# Patient Record
Sex: MALE | Race: WHITE | NOT HISPANIC OR LATINO | Employment: FULL TIME | URBAN - NONMETROPOLITAN AREA
[De-identification: names, ages, dates, MRNs, and addresses within clinical notes are randomized per-mention and may not be internally consistent; named-entity substitution may affect disease eponyms.]

---

## 2017-04-17 ENCOUNTER — NON-PROVIDER VISIT (OUTPATIENT)
Dept: URGENT CARE | Facility: PHYSICIAN GROUP | Age: 27
End: 2017-04-17

## 2017-04-17 DIAGNOSIS — Z02.1 PRE-EMPLOYMENT DRUG SCREENING: ICD-10-CM

## 2017-04-17 LAB
AMP AMPHETAMINE: NORMAL
COC COCAINE: NORMAL
INT CON NEG: NEGATIVE
INT CON POS: POSITIVE
MET METHAMPHETAMINES: NORMAL
OPI OPIATES: NORMAL
PCP PHENCYCLIDINE: NORMAL
POC DRUG COMMENT 753798-OCCUPATIONAL HEALTH: NORMAL
THC: NORMAL

## 2017-04-17 PROCEDURE — 80305 DRUG TEST PRSMV DIR OPT OBS: CPT | Performed by: FAMILY MEDICINE

## 2018-07-02 ENCOUNTER — APPOINTMENT (OUTPATIENT)
Dept: RADIOLOGY | Facility: MEDICAL CENTER | Age: 28
End: 2018-07-02
Attending: EMERGENCY MEDICINE
Payer: MEDICAID

## 2018-07-02 ENCOUNTER — HOSPITAL ENCOUNTER (EMERGENCY)
Facility: MEDICAL CENTER | Age: 28
End: 2018-07-02
Attending: EMERGENCY MEDICINE
Payer: MEDICAID

## 2018-07-02 VITALS
TEMPERATURE: 98.4 F | DIASTOLIC BLOOD PRESSURE: 78 MMHG | BODY MASS INDEX: 27.2 KG/M2 | RESPIRATION RATE: 18 BRPM | SYSTOLIC BLOOD PRESSURE: 112 MMHG | HEIGHT: 69 IN | WEIGHT: 183.64 LBS | HEART RATE: 76 BPM | OXYGEN SATURATION: 98 %

## 2018-07-02 DIAGNOSIS — N20.1 URETERAL STONE: ICD-10-CM

## 2018-07-02 LAB
ALBUMIN SERPL BCP-MCNC: 4.5 G/DL (ref 3.2–4.9)
ALBUMIN/GLOB SERPL: 2 G/DL
ALP SERPL-CCNC: 56 U/L (ref 30–99)
ALT SERPL-CCNC: 16 U/L (ref 2–50)
ANION GAP SERPL CALC-SCNC: 7 MMOL/L (ref 0–11.9)
APPEARANCE UR: ABNORMAL
AST SERPL-CCNC: 19 U/L (ref 12–45)
BACTERIA #/AREA URNS HPF: NEGATIVE /HPF
BASOPHILS # BLD AUTO: 0.2 % (ref 0–1.8)
BASOPHILS # BLD: 0.01 K/UL (ref 0–0.12)
BILIRUB SERPL-MCNC: 1.2 MG/DL (ref 0.1–1.5)
BILIRUB UR QL STRIP.AUTO: NEGATIVE
BUN SERPL-MCNC: 14 MG/DL (ref 8–22)
CALCIUM SERPL-MCNC: 9.3 MG/DL (ref 8.5–10.5)
CAOX CRY #/AREA URNS HPF: ABNORMAL /HPF
CHLORIDE SERPL-SCNC: 106 MMOL/L (ref 96–112)
CO2 SERPL-SCNC: 24 MMOL/L (ref 20–33)
COLOR UR: ABNORMAL
CREAT SERPL-MCNC: 1.05 MG/DL (ref 0.5–1.4)
EOSINOPHIL # BLD AUTO: 0.08 K/UL (ref 0–0.51)
EOSINOPHIL NFR BLD: 1.2 % (ref 0–6.9)
EPI CELLS #/AREA URNS HPF: ABNORMAL /HPF
ERYTHROCYTE [DISTWIDTH] IN BLOOD BY AUTOMATED COUNT: 41.1 FL (ref 35.9–50)
GLOBULIN SER CALC-MCNC: 2.3 G/DL (ref 1.9–3.5)
GLUCOSE SERPL-MCNC: 110 MG/DL (ref 65–99)
GLUCOSE UR STRIP.AUTO-MCNC: NEGATIVE MG/DL
HCT VFR BLD AUTO: 49.6 % (ref 42–52)
HGB BLD-MCNC: 16.6 G/DL (ref 14–18)
IMM GRANULOCYTES # BLD AUTO: 0.01 K/UL (ref 0–0.11)
IMM GRANULOCYTES NFR BLD AUTO: 0.2 % (ref 0–0.9)
KETONES UR STRIP.AUTO-MCNC: 15 MG/DL
LEUKOCYTE ESTERASE UR QL STRIP.AUTO: ABNORMAL
LYMPHOCYTES # BLD AUTO: 1.26 K/UL (ref 1–4.8)
LYMPHOCYTES NFR BLD: 19.5 % (ref 22–41)
MCH RBC QN AUTO: 29.6 PG (ref 27–33)
MCHC RBC AUTO-ENTMCNC: 33.5 G/DL (ref 33.7–35.3)
MCV RBC AUTO: 88.4 FL (ref 81.4–97.8)
MICRO URNS: ABNORMAL
MONOCYTES # BLD AUTO: 0.4 K/UL (ref 0–0.85)
MONOCYTES NFR BLD AUTO: 6.2 % (ref 0–13.4)
NEUTROPHILS # BLD AUTO: 4.71 K/UL (ref 1.82–7.42)
NEUTROPHILS NFR BLD: 72.7 % (ref 44–72)
NITRITE UR QL STRIP.AUTO: POSITIVE
NRBC # BLD AUTO: 0 K/UL
NRBC BLD-RTO: 0 /100 WBC
PH UR STRIP.AUTO: 7 [PH]
PLATELET # BLD AUTO: 183 K/UL (ref 164–446)
PMV BLD AUTO: 10.2 FL (ref 9–12.9)
POTASSIUM SERPL-SCNC: 3.7 MMOL/L (ref 3.6–5.5)
PROT SERPL-MCNC: 6.8 G/DL (ref 6–8.2)
PROT UR QL STRIP: 100 MG/DL
RBC # BLD AUTO: 5.61 M/UL (ref 4.7–6.1)
RBC # URNS HPF: >150 /HPF
RBC UR QL AUTO: ABNORMAL
SODIUM SERPL-SCNC: 137 MMOL/L (ref 135–145)
SP GR UR STRIP.AUTO: 1.02
UROBILINOGEN UR STRIP.AUTO-MCNC: 2 MG/DL
WBC # BLD AUTO: 6.5 K/UL (ref 4.8–10.8)
WBC #/AREA URNS HPF: ABNORMAL /HPF

## 2018-07-02 PROCEDURE — 99285 EMERGENCY DEPT VISIT HI MDM: CPT

## 2018-07-02 PROCEDURE — 81001 URINALYSIS AUTO W/SCOPE: CPT

## 2018-07-02 PROCEDURE — 96361 HYDRATE IV INFUSION ADD-ON: CPT

## 2018-07-02 PROCEDURE — 700105 HCHG RX REV CODE 258: Performed by: EMERGENCY MEDICINE

## 2018-07-02 PROCEDURE — 700111 HCHG RX REV CODE 636 W/ 250 OVERRIDE (IP): Performed by: EMERGENCY MEDICINE

## 2018-07-02 PROCEDURE — 96375 TX/PRO/DX INJ NEW DRUG ADDON: CPT

## 2018-07-02 PROCEDURE — 85025 COMPLETE CBC W/AUTO DIFF WBC: CPT

## 2018-07-02 PROCEDURE — 80053 COMPREHEN METABOLIC PANEL: CPT

## 2018-07-02 PROCEDURE — 74176 CT ABD & PELVIS W/O CONTRAST: CPT

## 2018-07-02 PROCEDURE — 96374 THER/PROPH/DIAG INJ IV PUSH: CPT

## 2018-07-02 RX ORDER — TAMSULOSIN HYDROCHLORIDE 0.4 MG/1
0.4 CAPSULE ORAL DAILY
Qty: 7 CAP | Refills: 0 | Status: SHIPPED | OUTPATIENT
Start: 2018-07-02 | End: 2018-07-09

## 2018-07-02 RX ORDER — ONDANSETRON 4 MG/1
4 TABLET, ORALLY DISINTEGRATING ORAL EVERY 6 HOURS PRN
Qty: 20 TAB | Refills: 0 | Status: SHIPPED | OUTPATIENT
Start: 2018-07-02 | End: 2022-05-26

## 2018-07-02 RX ORDER — SODIUM CHLORIDE, SODIUM LACTATE, POTASSIUM CHLORIDE, CALCIUM CHLORIDE 600; 310; 30; 20 MG/100ML; MG/100ML; MG/100ML; MG/100ML
1000 INJECTION, SOLUTION INTRAVENOUS ONCE
Status: COMPLETED | OUTPATIENT
Start: 2018-07-02 | End: 2018-07-02

## 2018-07-02 RX ORDER — IBUPROFEN 600 MG/1
600 TABLET ORAL EVERY 6 HOURS PRN
Qty: 30 TAB | Refills: 0 | Status: SHIPPED | OUTPATIENT
Start: 2018-07-02 | End: 2022-05-26

## 2018-07-02 RX ORDER — ONDANSETRON 2 MG/ML
4 INJECTION INTRAMUSCULAR; INTRAVENOUS ONCE
Status: COMPLETED | OUTPATIENT
Start: 2018-07-02 | End: 2018-07-02

## 2018-07-02 RX ORDER — KETOROLAC TROMETHAMINE 30 MG/ML
15 INJECTION, SOLUTION INTRAMUSCULAR; INTRAVENOUS ONCE
Status: COMPLETED | OUTPATIENT
Start: 2018-07-02 | End: 2018-07-02

## 2018-07-02 RX ORDER — HYDROCODONE BITARTRATE AND ACETAMINOPHEN 5; 325 MG/1; MG/1
1 TABLET ORAL EVERY 6 HOURS PRN
Qty: 10 TAB | Refills: 0 | Status: SHIPPED | OUTPATIENT
Start: 2018-07-02 | End: 2018-07-02

## 2018-07-02 RX ADMIN — SODIUM CHLORIDE, POTASSIUM CHLORIDE, SODIUM LACTATE AND CALCIUM CHLORIDE 1000 ML: 600; 310; 30; 20 INJECTION, SOLUTION INTRAVENOUS at 07:51

## 2018-07-02 RX ADMIN — ONDANSETRON 4 MG: 2 INJECTION, SOLUTION INTRAMUSCULAR; INTRAVENOUS at 07:51

## 2018-07-02 RX ADMIN — KETOROLAC TROMETHAMINE 15 MG: 30 INJECTION, SOLUTION INTRAMUSCULAR; INTRAVENOUS at 07:51

## 2018-07-02 ASSESSMENT — PAIN SCALES - GENERAL
PAINLEVEL_OUTOF10: 8
PAINLEVEL_OUTOF10: 2

## 2018-07-02 ASSESSMENT — LIFESTYLE VARIABLES: DO YOU DRINK ALCOHOL: NO

## 2018-07-02 NOTE — ED TRIAGE NOTES
Chief Complaint   Patient presents with   • Flank Pain     Started yesterday on left side.    • Blood in Urine     Blood in urine start this morning.    • N/V     Patient states he just vomited in bathroom from the pain     Patient ambulated to triage with steady gait, no assistance needed.     Patient placed back in triage and updated on triage process.

## 2018-07-02 NOTE — ED NOTES
Pt received discharge, follow up and return to ED instructions; pt verbalized understanding. IV was removed, site WNL, dressing applied. Pt denied any needs/pain. Pt ambulated with a steady gait. Pt [facility]

## 2018-07-02 NOTE — DISCHARGE INSTRUCTIONS
Kidney Stones  Kidney stones (urolithiasis) are solid, rock-like deposits that form inside of the organs that make urine (kidneys). A kidney stone may form in a kidney and move into the bladder, where it can cause intense pain and block the flow of urine. Kidney stones are created when high levels of certain minerals are found in the urine. They are usually passed through urination, but in some cases, medical treatment may be needed to remove them.  What are the causes?  Kidney stones may be caused by:  · A condition in which certain glands produce too much parathyroid hormone (primary hyperparathyroidism), which causes too much calcium buildup in the blood.  · Buildup of uric acid crystals in the bladder (hyperuricosuria). Uric acid is a chemical that the body produces when you eat certain foods. It usually exits the body in the urine.  · Narrowing (stricture) of one or both of the tubes that drain urine from the kidneys to the bladder (ureters).  · A kidney blockage that is present at birth (congenital obstruction).  · Past surgery on the kidney or the ureters, such as gastric bypass surgery.  What increases the risk?  The following factors make you more likely to develop kidney stones:  · Having had a kidney stone in the past.  · Having a family history of kidney stones.  · Not drinking enough water.  · Eating a diet that is high in protein, salt (sodium), or sugar.  · Being overweight or obese.  What are the signs or symptoms?  Symptoms of a kidney stone may include:  · Nausea.  · Vomiting.  · Blood in the urine (hematuria).  · Pain in the side of the abdomen, right below the ribs (flank pain). Pain usually spreads (radiates) to the groin.  · Needing to urinate frequently or urgently.  How is this diagnosed?  This condition may be diagnosed based on:  · Your medical history.  · A physical exam.  · Blood tests.  · Urine tests.  · CT scan.  · Abdominal X-ray.  · A procedure to examine the inside of the bladder  (cystoscopy).  How is this treated?  Treatment for kidney stones depends on the size, location, and makeup of the stones. Treatment may involve:  · Analyzing your urine before and after you pass the stone through urination.  · Being monitored at the hospital until you pass the stone through urination.  · Increasing your fluid intake and decreasing the amount of calcium and protein in your diet.  · A procedure to break up kidney stones in the bladder using:  ¨ A focused beam of light (laser therapy).  ¨ Shock waves (extracorporeal shock wave lithotripsy).  · Surgery to remove kidney stones. This may be needed if you have severe pain or have stones that block your urinary tract.  Follow these instructions at home:  Eating and drinking  · Drink enough fluid to keep your urine clear or pale yellow. This will help you to pass the kidney stone.  · If directed, change your diet. This may include:  ¨ Limiting how much sodium you eat.  ¨ Eating more fruits and vegetables.  ¨ Limiting how much meat, poultry, fish, and eggs you eat.  · Follow instructions from your health care provider about eating or drinking restrictions.  General instructions  · Collect urine samples as told by your health care provider. You may need to collect a urine sample:  ¨ 24 hours after you pass the stone.  ¨ 8-12 weeks after passing the kidney stone, and every 6-12 months after that.  · Strain your urine every time you urinate, for as long as directed. Use the strainer that your health care provider recommends.  · Do not throw out the kidney stone after passing it. Keep the stone so it can be tested by your health care provider. Testing the makeup of your kidney stone may help prevent you from getting kidney stones in the future.  · Take over-the-counter and prescription medicines only as told by your health care provider.  · Keep all follow-up visits as told by your health care provider. This is important. You may need follow-up X-rays or  ultrasounds to make sure that your stone has passed.  How is this prevented?  To prevent another kidney stone:  · Drink enough fluid to keep your urine clear or pale yellow. This is the best way to prevent kidney stones.  · Eat a healthy diet and follow recommendations from your health care provider about foods to avoid. You may be instructed to eat a low-protein diet. Recommendations vary depending on the type of kidney stone that you have.  · Maintain a healthy weight.  Contact a health care provider if:  · You have pain that gets worse or does not get better with medicine.  Get help right away if:  · You have a fever or chills.  · You develop severe pain.  · You develop new abdominal pain.  · You faint.  · You are unable to urinate.  This information is not intended to replace advice given to you by your health care provider. Make sure you discuss any questions you have with your health care provider.  Document Released: 12/18/2006 Document Revised: 07/07/2017 Document Reviewed: 06/02/2017  [employer] Interactive Patient Education © 2017 [employer]

## 2018-07-02 NOTE — ED PROVIDER NOTES
ED Provider Note    ER PROVIDER NOTE        CHIEF COMPLAINT  Chief Complaint   Patient presents with   • Flank Pain     Started yesterday on left side.    • Blood in Urine     Blood in urine start this morning.    • N/V     Patient states he just vomited in bathroom from the pain       HPI  Solis Schneider is a 27 y.o. male who presents to the emergency department complaining of right-sided flank pain.  States he felt like he had a pulled muscle last night although pain became more sharp and intense throughout the night and this morning.  Does seem to wax and wane somewhat with some radiation to the front.  No real alleviating or aggravating factors.  This morning he noticed some blood in his urine as well, has had no dysuria.  No fevers or chills.  When the pain comes in waves he does have some nausea as well as nonbloody emesis.  No prior similar episodes.    REVIEW OF SYSTEMS  Pertinent positives include flank pain. Pertinent negatives include no fever. See HPI for details. All other systems reviewed and are negative.    PAST MEDICAL HISTORY       SURGICAL HISTORY  patient denies any surgical history    FAMILY HISTORY  Family History   Problem Relation Age of Onset   • Hypertension Father    • Cancer Paternal Grandmother      lung       SOCIAL HISTORY  Social History     Social History   • Marital status: Single     Spouse name: N/A   • Number of children: N/A   • Years of education: N/A     Social History Main Topics   • Smoking status: Current Every Day Smoker     Packs/day: 0.50     Years: 4.00     Types: Cigarettes   • Smokeless tobacco: Never Used   • Alcohol use No   • Drug use: No   • Sexual activity: Not on file     Other Topics Concern   • Not on file     Social History Narrative   • No narrative on file      History   Drug Use No       CURRENT MEDICATIONS  Home Medications     Reviewed by Johnny Baez R.N. (Registered Nurse) on 07/02/18 at 0730  Med List Status: Partial   Medication Last Dose Status  "       Patient Roberth Taking any Medications                       ALLERGIES  No Known Allergies    PHYSICAL EXAM  VITAL SIGNS: /78   Pulse 76   Temp 36.9 °C (98.4 °F)   Resp 18   Ht 1.753 m (5' 9\")   Wt 83.3 kg (183 lb 10.3 oz)   SpO2 98%   BMI 27.12 kg/m²   Pulse ox interpretation: I interpret this pulse ox as normal.    Constitutional: Alert uncomfortable  HENT: No signs of trauma, Bilateral external ears normal, Nose normal.  Mucous membranes dry  Eyes: Pupils are equal and reactive, Conjunctiva normal, Non-icteric.   Neck: Normal range of motion, No tenderness, Supple, No stridor.   Lymphatic: No lymphadenopathy noted.   Cardiovascular: Regular rate and rhythm, no murmurs.   Thorax & Lungs: Normal breath sounds, No respiratory distress, No wheezing, No chest tenderness.   Abdomen: Bowel sounds normal, Soft, No tenderness, No masses, No pulsatile masses. No peritoneal signs.  Skin: Warm, Dry, No erythema, No rash.   Back: No bony tenderness, No CVA tenderness.   Musculoskeletal: Good range of motion in all major joints. No tenderness to palpation or major deformities noted.   Neurologic: Alert , Normal motor function, Normal sensory function, No focal deficits noted.   Psychiatric: Affect normal, Judgment normal, Mood normal.     DIAGNOSTIC STUDIES / PROCEDURES        LABS  Labs Reviewed   CBC WITH DIFFERENTIAL - Abnormal; Notable for the following:        Result Value    MCHC 33.5 (*)     Neutrophils-Polys 72.70 (*)     Lymphocytes 19.50 (*)     All other components within normal limits   COMP METABOLIC PANEL - Abnormal; Notable for the following:     Glucose 110 (*)     All other components within normal limits   URINALYSIS - Abnormal; Notable for the following:     Character Turbid (*)     Ketones 15 (*)     Protein 100 (*)     Nitrite Positive (*)     Leukocyte Esterase Trace (*)     Occult Blood Large (*)     All other components within normal limits   URINE MICROSCOPIC (W/UA) - Abnormal; " Notable for the following:     WBC 0-2 (*)     RBC >150 (*)     All other components within normal limits   ESTIMATED GFR       All labs reviewed by me.    RADIOLOGY  CT-RENAL COLIC EVALUATION(A/P W/O)   Final Result         1.  Minor right hydronephrosis secondary to 3.1 mm stone in the distal right ureter at the level of the right acetabulum.      2.  3 mm stone in the lower pole the right kidney.      3.  No left nephrolithiasis or hydronephrosis.        The radiologist's interpretation of all radiological studies have been reviewed by me.    COURSE & MEDICAL DECISION MAKING  Nursing notes, VS, PMSFHx reviewed in chart.    7:36 AM Patient seen and examined at bedside. Patient will be treated with ketorolac, Zofran and IV fluids. Ordered for labs, CT, urinalysis to evaluate his symptoms.     IV fluids were given for dehydration with dry mucous membranes and vomiting    8:16 AM  On reevaluation patient appears improved and more well hydrated with appropriate heart rate after IV fluids, states his pain is much better and well controlled    9:39 AM  Patient reevaluated, pain is still well controlled, abdomen is soft and nontender, he is tolerating a cup of water, will plan for discharge      Decision Making:  This is a 27 y.o. male presenting with right-sided flank pain.  He is finding to have a 3 mm ureteral stone likely causing his pain.  There is no finding of significant obstruction or concomitant pyelonephritis urinary tract infection is overall well-appearing with appropriate pain control and tolerating p.o. at the time of discharge.  Will prescribe Zofran, Flomax, ibuprofen as needed for pain and follow-up with urology.  Patient states he does not want a prescription for anything stronger than ibuprofen. will trial conservative management as outpt with strict precautions to return if signs of obstruction or infection or complication occur. Also considered pyelonephritis, AAA, or intraabdominal pathology such  as colitis, diverticulitis, appy but less likely given no abd pain, non-toxic, CT results, clean UA, young age suggest against AAA     The patient will return for new or worsening symptoms and is stable at the time of discharge.    The patient is referred to a primary physician for blood pressure management, diabetic screening, and for all other preventative health concerns.        DISPOSITION:  Patient will be discharged home in stable condition.    FOLLOW UP:  Barrett Garcia M.D.  [id number] [place] [id number]  [phone]    In 1 week        OUTPATIENT MEDICATIONS:  New Prescriptions    IBUPROFEN (MOTRIN) 600 MG TAB    Take 1 Tab by mouth every 6 hours as needed for Moderate Pain.    ONDANSETRON (ZOFRAN ODT) 4 MG TABLET DISPERSIBLE    Take 1 Tab by mouth every 6 hours as needed for Nausea.    TAMSULOSIN (FLOMAX) 0.4 MG CAPSULE    Take 1 Cap by mouth every day for 7 days.         FINAL IMPRESSION  1. Ureteral stone         The note accurately reflects work and decisions made by me.  Garry Sanders  7/2/2018  10:10 AM

## 2018-07-02 NOTE — ED NOTES
[place] [facility] Fall Risk Assessment Tool    Present to ED because of fall  NO  (Syncope, seizure, or ALOC)  Age>70   NO  Altered Mental Status:  (Intoxicated with Alcohol or Substance Confusion,  Inability to follow instructions, disorientation)   NO  Impaired Mobility:  Ambulates or transfers with assistive devices or assist  Ambulates with unsteady gait and no assistance  Unable to ambulate or transfer   NO  Nursing Judgement  (Bowel or bladder incontinence, diarrhea, urinary   frequency or urgency, leg weakness, orthostatic   hypotension, dizziness or vertigo, narcotic use).   NO

## 2018-10-08 ENCOUNTER — NON-PROVIDER VISIT (OUTPATIENT)
Dept: URGENT CARE | Facility: PHYSICIAN GROUP | Age: 28
End: 2018-10-08

## 2018-10-08 DIAGNOSIS — Z02.1 PRE-EMPLOYMENT DRUG SCREENING: ICD-10-CM

## 2018-10-08 LAB
AMP AMPHETAMINE: NORMAL
COC COCAINE: NORMAL
INT CON NEG: NORMAL
INT CON POS: NORMAL
MET METHAMPHETAMINES: NORMAL
OPI OPIATES: NORMAL
PCP PHENCYCLIDINE: NORMAL
POC DRUG COMMENT 753798-OCCUPATIONAL HEALTH: NEGATIVE
THC: NORMAL

## 2018-10-08 PROCEDURE — 80305 DRUG TEST PRSMV DIR OPT OBS: CPT | Performed by: PHYSICIAN ASSISTANT

## 2018-12-28 ENCOUNTER — NON-PROVIDER VISIT (OUTPATIENT)
Dept: URGENT CARE | Facility: CLINIC | Age: 28
End: 2018-12-28

## 2018-12-28 DIAGNOSIS — Z02.1 PRE-EMPLOYMENT DRUG SCREENING: ICD-10-CM

## 2018-12-28 LAB
AMP AMPHETAMINE: NORMAL
COC COCAINE: NORMAL
INT CON NEG: NORMAL
INT CON POS: NORMAL
MET METHAMPHETAMINES: NORMAL
OPI OPIATES: NORMAL
PCP PHENCYCLIDINE: NORMAL
POC DRUG COMMENT 753798-OCCUPATIONAL HEALTH: NORMAL
THC: NORMAL

## 2018-12-28 PROCEDURE — 80305 DRUG TEST PRSMV DIR OPT OBS: CPT | Performed by: NURSE PRACTITIONER

## 2019-01-16 ENCOUNTER — NON-PROVIDER VISIT (OUTPATIENT)
Dept: URGENT CARE | Facility: PHYSICIAN GROUP | Age: 29
End: 2019-01-16

## 2019-01-16 DIAGNOSIS — Z02.1 PRE-EMPLOYMENT DRUG SCREENING: ICD-10-CM

## 2019-01-16 PROCEDURE — 80305 DRUG TEST PRSMV DIR OPT OBS: CPT | Performed by: PHYSICIAN ASSISTANT

## 2019-01-21 ENCOUNTER — NON-PROVIDER VISIT (OUTPATIENT)
Dept: URGENT CARE | Facility: PHYSICIAN GROUP | Age: 29
End: 2019-01-21

## 2019-01-21 DIAGNOSIS — Z02.1 PRE-EMPLOYMENT DRUG SCREENING: ICD-10-CM

## 2019-01-21 LAB
BREATH ALCOHOL COMMENT: NORMAL
POC BREATHALIZER: 0 PERCENT (ref 0–0.01)

## 2019-01-21 PROCEDURE — 82075 ASSAY OF BREATH ETHANOL: CPT | Performed by: PHYSICIAN ASSISTANT

## 2019-01-21 PROCEDURE — 8907 PR URINE COLLECT ONLY: Performed by: PHYSICIAN ASSISTANT

## 2022-05-25 SDOH — ECONOMIC STABILITY: TRANSPORTATION INSECURITY
IN THE PAST 12 MONTHS, HAS LACK OF TRANSPORTATION KEPT YOU FROM MEETINGS, WORK, OR FROM GETTING THINGS NEEDED FOR DAILY LIVING?: NO

## 2022-05-25 SDOH — ECONOMIC STABILITY: HOUSING INSECURITY
IN THE LAST 12 MONTHS, WAS THERE A TIME WHEN YOU DID NOT HAVE A STEADY PLACE TO SLEEP OR SLEPT IN A SHELTER (INCLUDING NOW)?: NO

## 2022-05-25 SDOH — ECONOMIC STABILITY: TRANSPORTATION INSECURITY
IN THE PAST 12 MONTHS, HAS LACK OF RELIABLE TRANSPORTATION KEPT YOU FROM MEDICAL APPOINTMENTS, MEETINGS, WORK OR FROM GETTING THINGS NEEDED FOR DAILY LIVING?: NO

## 2022-05-25 SDOH — ECONOMIC STABILITY: FOOD INSECURITY: WITHIN THE PAST 12 MONTHS, THE FOOD YOU BOUGHT JUST DIDN'T LAST AND YOU DIDN'T HAVE MONEY TO GET MORE.: NEVER TRUE

## 2022-05-25 SDOH — HEALTH STABILITY: MENTAL HEALTH
STRESS IS WHEN SOMEONE FEELS TENSE, NERVOUS, ANXIOUS, OR CAN'T SLEEP AT NIGHT BECAUSE THEIR MIND IS TROUBLED. HOW STRESSED ARE YOU?: TO SOME EXTENT

## 2022-05-25 SDOH — ECONOMIC STABILITY: FOOD INSECURITY: WITHIN THE PAST 12 MONTHS, YOU WORRIED THAT YOUR FOOD WOULD RUN OUT BEFORE YOU GOT MONEY TO BUY MORE.: NEVER TRUE

## 2022-05-25 SDOH — ECONOMIC STABILITY: INCOME INSECURITY: HOW HARD IS IT FOR YOU TO PAY FOR THE VERY BASICS LIKE FOOD, HOUSING, MEDICAL CARE, AND HEATING?: NOT VERY HARD

## 2022-05-25 SDOH — ECONOMIC STABILITY: INCOME INSECURITY: IN THE LAST 12 MONTHS, WAS THERE A TIME WHEN YOU WERE NOT ABLE TO PAY THE MORTGAGE OR RENT ON TIME?: NO

## 2022-05-25 SDOH — HEALTH STABILITY: PHYSICAL HEALTH: ON AVERAGE, HOW MANY MINUTES DO YOU ENGAGE IN EXERCISE AT THIS LEVEL?: 120 MIN

## 2022-05-25 SDOH — ECONOMIC STABILITY: HOUSING INSECURITY: IN THE LAST 12 MONTHS, HOW MANY PLACES HAVE YOU LIVED?: 3

## 2022-05-25 SDOH — HEALTH STABILITY: PHYSICAL HEALTH: ON AVERAGE, HOW MANY DAYS PER WEEK DO YOU ENGAGE IN MODERATE TO STRENUOUS EXERCISE (LIKE A BRISK WALK)?: 7 DAYS

## 2022-05-25 SDOH — ECONOMIC STABILITY: TRANSPORTATION INSECURITY
IN THE PAST 12 MONTHS, HAS THE LACK OF TRANSPORTATION KEPT YOU FROM MEDICAL APPOINTMENTS OR FROM GETTING MEDICATIONS?: NO

## 2022-05-25 ASSESSMENT — LIFESTYLE VARIABLES
HOW OFTEN DO YOU HAVE SIX OR MORE DRINKS ON ONE OCCASION: NEVER
AUDIT-C TOTAL SCORE: 0
SKIP TO QUESTIONS 9-10: 1
HOW OFTEN DO YOU HAVE A DRINK CONTAINING ALCOHOL: NEVER
HOW MANY STANDARD DRINKS CONTAINING ALCOHOL DO YOU HAVE ON A TYPICAL DAY: PATIENT DOES NOT DRINK

## 2022-05-25 ASSESSMENT — SOCIAL DETERMINANTS OF HEALTH (SDOH)
WITHIN THE PAST 12 MONTHS, YOU WORRIED THAT YOUR FOOD WOULD RUN OUT BEFORE YOU GOT THE MONEY TO BUY MORE: NEVER TRUE
HOW OFTEN DO YOU HAVE SIX OR MORE DRINKS ON ONE OCCASION: NEVER
DO YOU BELONG TO ANY CLUBS OR ORGANIZATIONS SUCH AS CHURCH GROUPS UNIONS, FRATERNAL OR ATHLETIC GROUPS, OR SCHOOL GROUPS?: PATIENT DECLINED
HOW HARD IS IT FOR YOU TO PAY FOR THE VERY BASICS LIKE FOOD, HOUSING, MEDICAL CARE, AND HEATING?: NOT VERY HARD
HOW OFTEN DO YOU HAVE A DRINK CONTAINING ALCOHOL: NEVER
HOW OFTEN DO YOU ATTEND CHURCH OR RELIGIOUS SERVICES?: PATIENT DECLINED
HOW OFTEN DO YOU GET TOGETHER WITH FRIENDS OR RELATIVES?: ONCE A WEEK
HOW MANY DRINKS CONTAINING ALCOHOL DO YOU HAVE ON A TYPICAL DAY WHEN YOU ARE DRINKING: PATIENT DOES NOT DRINK
DO YOU BELONG TO ANY CLUBS OR ORGANIZATIONS SUCH AS CHURCH GROUPS UNIONS, FRATERNAL OR ATHLETIC GROUPS, OR SCHOOL GROUPS?: PATIENT DECLINED
HOW OFTEN DO YOU GET TOGETHER WITH FRIENDS OR RELATIVES?: ONCE A WEEK
HOW OFTEN DO YOU ATTENT MEETINGS OF THE CLUB OR ORGANIZATION YOU BELONG TO?: PATIENT DECLINED
HOW OFTEN DO YOU ATTEND CHURCH OR RELIGIOUS SERVICES?: PATIENT DECLINED
IN A TYPICAL WEEK, HOW MANY TIMES DO YOU TALK ON THE PHONE WITH FAMILY, FRIENDS, OR NEIGHBORS?: THREE TIMES A WEEK
HOW OFTEN DO YOU ATTENT MEETINGS OF THE CLUB OR ORGANIZATION YOU BELONG TO?: PATIENT DECLINED
IN A TYPICAL WEEK, HOW MANY TIMES DO YOU TALK ON THE PHONE WITH FAMILY, FRIENDS, OR NEIGHBORS?: THREE TIMES A WEEK

## 2022-05-26 ENCOUNTER — OFFICE VISIT (OUTPATIENT)
Dept: INTERNAL MEDICINE | Facility: OTHER | Age: 32
End: 2022-05-26
Payer: COMMERCIAL

## 2022-05-26 VITALS
BODY MASS INDEX: 30.27 KG/M2 | SYSTOLIC BLOOD PRESSURE: 124 MMHG | WEIGHT: 204.4 LBS | DIASTOLIC BLOOD PRESSURE: 74 MMHG | OXYGEN SATURATION: 97 % | HEART RATE: 89 BPM | TEMPERATURE: 98.4 F | HEIGHT: 69 IN

## 2022-05-26 DIAGNOSIS — M54.40 ACUTE BILATERAL LOW BACK PAIN WITH SCIATICA, SCIATICA LATERALITY UNSPECIFIED: ICD-10-CM

## 2022-05-26 DIAGNOSIS — Z72.0 TOBACCO USE: ICD-10-CM

## 2022-05-26 DIAGNOSIS — Z78.9 HISTORY OF INCARCERATION: ICD-10-CM

## 2022-05-26 DIAGNOSIS — Z00.00 ROUTINE PHYSICAL EXAMINATION: ICD-10-CM

## 2022-05-26 DIAGNOSIS — Z87.898 HISTORY OF ALCOHOL USE DISORDER: ICD-10-CM

## 2022-05-26 DIAGNOSIS — E66.9 OBESITY (BMI 30.0-34.9): ICD-10-CM

## 2022-05-26 PROCEDURE — 99204 OFFICE O/P NEW MOD 45 MIN: CPT | Mod: GC | Performed by: STUDENT IN AN ORGANIZED HEALTH CARE EDUCATION/TRAINING PROGRAM

## 2022-05-26 RX ORDER — CYCLOBENZAPRINE HCL 5 MG
5 TABLET ORAL 3 TIMES DAILY PRN
Qty: 90 TABLET | Refills: 1 | Status: SHIPPED | OUTPATIENT
Start: 2022-05-26

## 2022-05-26 RX ORDER — NAPROXEN 500 MG/1
500 TABLET ORAL 2 TIMES DAILY WITH MEALS
Qty: 60 TABLET | Refills: 1 | Status: SHIPPED | OUTPATIENT
Start: 2022-05-26

## 2022-05-26 ASSESSMENT — PATIENT HEALTH QUESTIONNAIRE - PHQ9: CLINICAL INTERPRETATION OF PHQ2 SCORE: 0

## 2022-05-26 NOTE — LETTER
[facility]  Maykel Case D.O.  [address]  [place] [address]  Fax: [phone]   Authorization for Release/Disclosure of   Protected Health Information   Name: SOLIS SCHNEIDER : 1990 SSN: [id number]   Address: [address]  [place] [address] Phone:    [phone] (home)    I authorize the entity listed below to release/disclose the PHI below to:   [facility]/Maykel Case D.O. and Maykel Case D.O.   Provider or Entity Name:     Address   City, State, Zip   Phone:      Fax:     Reason for request: continuity of care   Information to be released:    [  ] LAST COLONOSCOPY,  including any PATH REPORT and follow-up  [  ] LAST FIT/COLOGUARD RESULT [  ] LAST DEXA  [  ] LAST MAMMOGRAM  [  ] LAST PAP  [  ] LAST LABS [  ] RETINA EXAM REPORT  [  ] IMMUNIZATION RECORDS  [  ] Release all info      [  ] Check here and initial the line next to each item to release ALL health information INCLUDING  _____ Care and treatment for drug and / or alcohol abuse  _____ HIV testing, infection status, or AIDS  _____ Genetic Testing    DATES OF SERVICE OR TIME PERIOD TO BE DISCLOSED: _____________  I understand and acknowledge that:  * This Authorization may be revoked at any time by you in writing, except if your health information has already been used or disclosed.  * Your health information that will be used or disclosed as a result of you signing this authorization could be re-disclosed by the recipient. If this occurs, your re-disclosed health information may no longer be protected by State or Federal laws.  * You may refuse to sign this Authorization. Your refusal will not affect your ability to obtain treatment.  * This Authorization becomes effective upon signing and will  on (date) __________.      If no date is indicated, this Authorization will  one (1) year from the signature date.    Name: Solis Schneider    Signature:   Date:     2022       PLEASE FAX REQUESTED  RECORDS BACK TO: [phone]

## 2022-05-26 NOTE — LETTER
May 26, 2022    To Whom It May Concern:         This is confirmation that Solis Schneider attended his scheduled appointment with Maykel Case D.O. on 5/26/22.    He will require increased breaks 3-4 per day 15 minutes in length and decreased lifting no more than 20 lbs and recommend more office work as able from 5/27-5/29 while he recovers from his acute condition. He can return to full work on 5/30 without restrictions.         If you have any questions please do not hesitate to call me at the phone number listed below.    Sincerely,          Maykel Case D.O.  [phone]

## 2022-05-26 NOTE — PROGRESS NOTES
Established Patient    Solis Schneider is a 31 y.o. male with a PMHx of obesity and recent incarceration who presents today to establish care.    HPI:     Low back pain:  -Patient has low back pain for 1 month, never had this before on both sides but worse on the left with radiation both up the back and down the leg to the foot. Hard to get up in the morning, takes 15 minutes and worse through the day with any pressure including touching his back or walking on it. 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, lifting, shoveling etc    Obesity:  -BMI 30  -Rides bike to work 6 miles there and back, on feet all day at work and works out at home and is losing weight about 10 lbs in a month  -Diet is better too, healthier with salads/banana for lunch, more fruits, white meats, etc    Smoking:  -Used to it, habit and bored, half pack a day for 10 years  -interested in quitting, thinking about zyn pouches    Primary Care:  -When incarcerated (1 year) had shots done, has records at home needs to bring in  -When released 5/4 they did blood draw to check for TB and HIV at [facility], both negative  -just had labs drawn at [facility] this month    ROS: As per HPI. Additional pertinent symptoms as noted below.    Review of Systems   Constitutional: Negative for chills, fever, malaise/fatigue and weight loss.   Respiratory: Negative for cough and shortness of breath.    Cardiovascular: Negative for chest pain and leg swelling.   Gastrointestinal: Negative for abdominal pain, nausea and vomiting.   Musculoskeletal: Positive for back pain. Negative for falls, joint pain, myalgias and neck pain.   Skin: Negative for itching  "and rash.   Neurological: Negative for sensory change, weakness and headaches.       Patient Active Problem List    Diagnosis Date Noted   • Routine physical examination 04/28/2016     History reviewed. No pertinent past medical history.    History reviewed. No pertinent surgical history.    Family History   Problem Relation Age of Onset   • Hypertension Father    • Cancer Paternal Grandmother         lung     Social History     Tobacco Use   • Smoking status: Current Every Day Smoker     Packs/day: 0.50     Years: 10.00     Pack years: 5.00     Types: Cigarettes   • Smokeless tobacco: Never Used   Vaping Use   • Vaping Use: Never used   Substance Use Topics   • Alcohol use: No     Alcohol/week: 0.0 oz   • Drug use: No     Current Outpatient Medications   Medication Sig Dispense Refill   • ondansetron (ZOFRAN ODT) 4 MG TABLET DISPERSIBLE Take 1 Tab by mouth every 6 hours as needed for Nausea. 20 Tab 0   • ibuprofen (MOTRIN) 600 MG Tab Take 1 Tab by mouth every 6 hours as needed for Moderate Pain. 30 Tab 0     No current facility-administered medications for this visit.     No Known Allergies    /74 (BP Location: Left arm, Patient Position: Sitting, BP Cuff Size: Adult)   Pulse 89   Temp 36.9 °C (98.4 °F) (Temporal)   Ht 1.753 m (5' 9\")   Wt 92.7 kg (204 lb 6.4 oz)   SpO2 97%   BMI 30.18 kg/m²     Physical Exam  Physical Exam  Constitutional:       General: He is not in acute distress.     Appearance: Normal appearance.      Comments: Slightly overweight but more muscular build   HENT:      Head: Normocephalic and atraumatic.      Mouth/Throat:      Mouth: Mucous membranes are moist.      Pharynx: Oropharynx is clear. No oropharyngeal exudate or posterior oropharyngeal erythema.   Cardiovascular:      Rate and Rhythm: Normal rate and regular rhythm.      Heart sounds: No murmur heard.    No gallop.   Pulmonary:      Effort: Pulmonary effort is normal. No respiratory distress.      Breath sounds: Normal " breath sounds. No wheezing or rales.   Abdominal:      General: Abdomen is flat. Bowel sounds are normal. There is no distension.      Palpations: Abdomen is soft.      Tenderness: There is no abdominal tenderness.   Musculoskeletal:         General: No swelling. Normal range of motion.      Comments: Low back pain at bilateral lumbar paraspinals, also pain at bilateral SI joints and quadratus lumborum worse on the left side.   Skin:     General: Skin is warm and dry.      Capillary Refill: Capillary refill takes less than 2 seconds.      Findings: No erythema or rash.   Neurological:      General: No focal deficit present.      Mental Status: He is alert and oriented to person, place, and time.   Psychiatric:         Mood and Affect: Mood normal.         Behavior: Behavior normal.          Assessment and Plan    Acute bilateral low back pain with sciatica  Bilateral low back worse on the left, appears to have sciatica from muscle impingment  -history of hard labor, may have arthritis also affecting nerve distribution but right now appears more muscular in origin  -no trauma noted    Plan:  -Flexeril 5 TID PRN, naproxen 500 BID for sx relief with exercises  -PT referral    History of alcohol use disorder  Patient has h/o alcohol use disorder  -says prior depression but not now, goes to many groups, given  pamphlet to read if interested    History of incarceration  Recently out of assisted 5/2022, had TB and HIV testing on discharge that were negative    Obesity (BMI 30.0-34.9)  Patient very mildly obese on BMI 30 but appears to be mostly muscle  -counseled on exercise and diet    Tobacco use  Recommended cesssation and counseling given, patient interested in quitting and trying zyn pouches from a friend      Routine physical examination  Patient needs shot record to confirm completion of pneumococcla and TDAP vaccines from assisted  -may need further assessment for RF for recently incarcerated individual but appears  to be healthy and well at this time and denies other concerning RF  -pending review of outside labs to see what additional labs patient may benefit from    Follow up: 8 weeks    Signed by: Maykel Case D.O.

## 2022-05-26 NOTE — PATIENT INSTRUCTIONS
If you need any help or support we can help refer you to resources, continue the good work in exercising and a healthy alcohol free lifestyle. Pamphlet for [facility] [place] given today.    Please send us your information on your shot records from shelter.    For low back pain: Naproxen 500 mg BID (use up to 4 weeks every day, try to use only when needed), flexeril 5 mg TID PRN after 1-2 weeks can increase to 10 mg if no or partial relief, use over the counter lidocaine patches, ice and heat, and will send you to PT for 5 visits. Please stretch according to handout given today    If pain is severe or red flags we discussed, recommend you go to orthopaedic clinic or ED if unavailable.

## 2022-05-29 PROBLEM — Z72.0 TOBACCO USE: Status: ACTIVE | Noted: 2022-05-29

## 2022-05-29 PROBLEM — Z78.9 HISTORY OF INCARCERATION: Status: ACTIVE | Noted: 2022-05-29

## 2022-05-29 PROBLEM — M54.40 ACUTE BILATERAL LOW BACK PAIN WITH SCIATICA: Status: ACTIVE | Noted: 2022-05-29

## 2022-05-29 PROBLEM — Z87.898 HISTORY OF ALCOHOL USE DISORDER: Status: ACTIVE | Noted: 2022-05-29

## 2022-05-29 PROBLEM — E66.9 OBESITY (BMI 30.0-34.9): Status: ACTIVE | Noted: 2022-05-29

## 2022-05-29 ASSESSMENT — ENCOUNTER SYMPTOMS
NECK PAIN: 0
HEADACHES: 0
FEVER: 0
CHILLS: 0
MYALGIAS: 0
VOMITING: 0
WEAKNESS: 0
ABDOMINAL PAIN: 0
FALLS: 0
SHORTNESS OF BREATH: 0
SENSORY CHANGE: 0
WEIGHT LOSS: 0
NAUSEA: 0
BACK PAIN: 1
COUGH: 0

## 2022-05-29 NOTE — ASSESSMENT & PLAN NOTE
Patient has h/o alcohol use disorder  -says prior depression but not now, goes to many groups, given [facility] pamphlet to read if interested

## 2022-05-29 NOTE — ASSESSMENT & PLAN NOTE
Patient very mildly obese on BMI 30 but appears to be mostly muscle  -counseled on exercise and diet

## 2022-05-29 NOTE — ASSESSMENT & PLAN NOTE
Patient needs shot record to confirm completion of pneumococcla and TDAP vaccines from alf  -may need further assessment for RF for recently incarcerated individual but appears to be healthy and well at this time and denies other concerning RF  -pending review of outside labs to see what additional labs patient may benefit from

## 2022-05-29 NOTE — ASSESSMENT & PLAN NOTE
Bilateral low back worse on the left, appears to have sciatica from muscle impingment  -history of hard labor, may have arthritis also affecting nerve distribution but right now appears more muscular in origin  -no trauma noted    Plan:  -Flexeril 5 TID PRN, naproxen 500 BID for sx relief with exercises  -PT referral

## 2022-05-29 NOTE — ASSESSMENT & PLAN NOTE
Recommended cesssation and counseling given, patient interested in quitting and trying zyn pouches from a friend

## 2023-10-25 ENCOUNTER — NON-PROVIDER VISIT (OUTPATIENT)
Dept: URGENT CARE | Facility: PHYSICIAN GROUP | Age: 33
End: 2023-10-25
Payer: COMMERCIAL

## 2023-10-25 DIAGNOSIS — Z02.83 ENCOUNTER FOR DRUG SCREENING: ICD-10-CM

## 2023-10-25 LAB
BREATH ALCOHOL COMMENT: NORMAL
POC BREATHALIZER: NEGATIVE PERCENT (ref 0–0.01)

## 2023-10-25 PROCEDURE — 82075 ASSAY OF BREATH ETHANOL: CPT | Performed by: NURSE PRACTITIONER

## 2023-10-26 NOTE — PROGRESS NOTES
Solis Schneider is a 32 y.o. male here for a non-provider visit for PP UDS BAT     If abnormal was an in office provider notified today (if so, indicate provider)? No    Routed to PCP? No

## 2024-01-08 ENCOUNTER — OFFICE VISIT (OUTPATIENT)
Dept: URGENT CARE | Facility: PHYSICIAN GROUP | Age: 34
End: 2024-01-08
Payer: COMMERCIAL

## 2024-01-08 VITALS
TEMPERATURE: 99 F | OXYGEN SATURATION: 95 % | SYSTOLIC BLOOD PRESSURE: 132 MMHG | HEART RATE: 127 BPM | DIASTOLIC BLOOD PRESSURE: 82 MMHG | HEIGHT: 69 IN | RESPIRATION RATE: 16 BRPM | WEIGHT: 210 LBS | BODY MASS INDEX: 31.1 KG/M2

## 2024-01-08 DIAGNOSIS — H66.002 NON-RECURRENT ACUTE SUPPURATIVE OTITIS MEDIA OF LEFT EAR WITHOUT SPONTANEOUS RUPTURE OF TYMPANIC MEMBRANE: ICD-10-CM

## 2024-01-08 PROCEDURE — 99203 OFFICE O/P NEW LOW 30 MIN: CPT | Performed by: NURSE PRACTITIONER

## 2024-01-08 PROCEDURE — 3079F DIAST BP 80-89 MM HG: CPT | Performed by: NURSE PRACTITIONER

## 2024-01-08 PROCEDURE — 3075F SYST BP GE 130 - 139MM HG: CPT | Performed by: NURSE PRACTITIONER

## 2024-01-08 RX ORDER — AMOXICILLIN AND CLAVULANATE POTASSIUM 875; 125 MG/1; MG/1
1 TABLET, FILM COATED ORAL 2 TIMES DAILY
Qty: 20 TABLET | Refills: 0 | Status: SHIPPED | OUTPATIENT
Start: 2024-01-08 | End: 2024-01-18

## 2024-01-08 NOTE — LETTER
January 8, 2024    To Whom It May Concern:         This is confirmation that Solis Schneider attended his scheduled appointment with JASWANT Gale on 1/08/24. Please excuse from work 1/8/24 through 1/9/24.       Sincerely,          JENAE Gale.  [phone]

## 2024-01-08 NOTE — PROGRESS NOTES
Date: 01/08/24     Chief Complaint:    Chief Complaint   Patient presents with    Otalgia     X7 days B ear pain, pressure, clogged, trouble hearing, congestion         History of Present Illness: 33 y.o. male  presents 7-day history of bilateral ear pain pressure and decreased hearing.  He has also had some congestion.  Patient denies any fevers or body aches.  No nausea vomiting diarrhea.  No shortness of breath chest pain or leg swelling.    ROS:  As stated in HPI       Medical/SX/ Social History:  Reviewed per chart    Medications:    Current Outpatient Medications on File Prior to Visit   Medication Sig Dispense Refill    naproxen (NAPROSYN) 500 MG Tab Take 1 Tablet by mouth 2 times a day with meals. (Patient not taking: Reported on 1/8/2024) 60 Tablet 1    cyclobenzaprine (FLEXERIL) 5 mg tablet Take 1 Tablet by mouth 3 times a day as needed for Muscle Spasms. (Patient not taking: Reported on 1/8/2024) 90 Tablet 1     No current facility-administered medications on file prior to visit.        Allergies:    Patient has no known allergies.     Problem list, medications, and allergies reviewed by myself today in Epic       Physical Exam:  Vitals:    01/08/24 1244   BP: 132/82   Pulse: (!) 127   Resp: 16   Temp: 37.2 °C (99 °F)   SpO2: 95%        Physical Exam  Constitutional:       General: He is awake.      Appearance: Normal appearance. He is not ill-appearing, toxic-appearing or diaphoretic.   HENT:      Head: Normocephalic and atraumatic.      Right Ear: Tympanic membrane, ear canal and external ear normal.      Left Ear: Ear canal and external ear normal. Tympanic membrane is erythematous and bulging.      Nose: Rhinorrhea present. Rhinorrhea is clear.      Mouth/Throat:      Lips: Pink.      Mouth: Mucous membranes are moist.      Tongue: No lesions.      Palate: No lesions.      Pharynx: Posterior oropharyngeal erythema present. No oropharyngeal exudate or uvula swelling.      Tonsils: No tonsillar exudate  or tonsillar abscesses.   Eyes:      General: Lids are normal. Gaze aligned appropriately. No allergic shiner or scleral icterus.     Extraocular Movements: Extraocular movements intact.      Conjunctiva/sclera: Conjunctivae normal.      Pupils: Pupils are equal, round, and reactive to light.   Cardiovascular:      Rate and Rhythm: Normal rate and regular rhythm.      Pulses:           Radial pulses are 2+ on the right side and 2+ on the left side.      Heart sounds: Normal heart sounds.   Pulmonary:      Effort: Pulmonary effort is normal.      Breath sounds: Normal breath sounds and air entry. No decreased breath sounds, wheezing, rhonchi or rales.   Abdominal:      General: Abdomen is flat. Bowel sounds are normal.      Palpations: Abdomen is soft.      Tenderness: There is no abdominal tenderness.   Musculoskeletal:      Right lower leg: No edema.      Left lower leg: No edema.   Lymphadenopathy:      Cervical: No cervical adenopathy.   Skin:     General: Skin is warm.      Capillary Refill: Capillary refill takes less than 2 seconds.      Coloration: Skin is not cyanotic or pale.   Neurological:      Mental Status: He is alert and oriented to person, place, and time.      Gait: Gait is intact.   Psychiatric:         Behavior: Behavior normal. Behavior is cooperative.            Medical Decision Making / Plan :  I personally reviewed prior external notes and test results pertinent to today's visit. Pt is clinically stable at today's acute urgent care visit.  Patient appears nontoxic with no acute distress noted. Appropriate for outpatient care at this time. The patient remained stable during the urgent care visit.     Pleasant 33 y.o. male  presented clinic with HPI exam is consistent with acute left otitis media with erythematous bulging dull TM.  Augmentin sent to the patient's pharmacy.  Patient did present to clinic with tachycardia although on exam patient is not tachycardic.  Shared decision-making was  utilized with patient for treatment plan. Differential Diagnosis, natural history, and supportive care discussed. Did advise patient on conservative measures for management of symptoms.  Patient is agreeable to pursue adequate rest, adequate hydration, saltwater gargle and Neti pot for any symptoms of upper respiratory congestion.  Over-the-counter analgesia and antipyretics on a p.r.n. basis as needed for pain and fever.  Did discuss over-the-counter cough medications.        1. Non-recurrent acute suppurative otitis media of left ear without spontaneous rupture of tympanic membrane    - amoxicillin-clavulanate (AUGMENTIN) 875-125 MG Tab; Take 1 Tablet by mouth 2 times a day for 10 days.  Dispense: 20 Tablet; Refill: 0       Medication discussed included indication for use and the potential benefits and side effects. Education was provided regarding the aforementioned assessments.  All of the patient's questions were answered to their satisfaction at the time of discharge. Patient was encouraged to monitor symptoms closely. Those signs and symptoms which would warrant concern and mandate seeking a higher level of service through the emergency department discussed at length.  Patient stated agreement and understanding of this plan of care.        Disposition:  Patient was discharged in stable condition.    Voice Recognition Disclaimer: Portions of this document were created using voice recognition software. The software does have a chance of producing errors of grammar and possibly content. I have made every reasonable attempt to correct obvious errors, but there may be errors of grammar and possibly content that I did not discover before finalizing the documentation.    Miesha Coulter, A.P.R.N.

## 2024-05-06 ENCOUNTER — APPOINTMENT (OUTPATIENT)
Dept: RADIOLOGY | Facility: MEDICAL CENTER | Age: 34
End: 2024-05-06
Attending: EMERGENCY MEDICINE
Payer: COMMERCIAL

## 2024-05-06 ENCOUNTER — HOSPITAL ENCOUNTER (EMERGENCY)
Facility: MEDICAL CENTER | Age: 34
End: 2024-05-06
Attending: EMERGENCY MEDICINE
Payer: COMMERCIAL

## 2024-05-06 VITALS
WEIGHT: 213.41 LBS | HEIGHT: 69 IN | RESPIRATION RATE: 16 BRPM | BODY MASS INDEX: 31.61 KG/M2 | OXYGEN SATURATION: 94 % | HEART RATE: 93 BPM | SYSTOLIC BLOOD PRESSURE: 146 MMHG | DIASTOLIC BLOOD PRESSURE: 78 MMHG | TEMPERATURE: 97.6 F

## 2024-05-06 DIAGNOSIS — S93.401A SPRAIN OF RIGHT ANKLE, UNSPECIFIED LIGAMENT, INITIAL ENCOUNTER: ICD-10-CM

## 2024-05-06 PROCEDURE — 99283 EMERGENCY DEPT VISIT LOW MDM: CPT

## 2024-05-06 PROCEDURE — 73610 X-RAY EXAM OF ANKLE: CPT | Mod: RT

## 2024-05-06 ASSESSMENT — PAIN DESCRIPTION - PAIN TYPE: TYPE: ACUTE PAIN

## 2024-05-06 NOTE — ED NOTES
AVS discussed with patient. Pt ambulatory to lobby with steady gait with walking boot and crutches. F/u information discussed

## 2024-05-06 NOTE — ED NOTES
Pain to R metatarsal area. No pain in ankle. Pt ambulatory with steady gait with pain to exam room. Reports taking ibuprofen yesterday with no relief. Given ice pack

## 2024-05-06 NOTE — ED TRIAGE NOTES
Chief Complaint   Patient presents with    Ankle Pain     R ankle pain x 3 weeks, CMS in tact, pt able to ambulate      Pt endorses running 3 weeks ago and rolling his ankle, ambulatory to triage for above complaint, A+O x 4, GCS 15, NAD, answering all questions appropriately

## 2024-05-06 NOTE — DISCHARGE INSTRUCTIONS
Use crutches and orthopedic boot for comfort, avoid activities causing increased pain.  Follow-up with orthopedics for recheck.

## 2024-05-06 NOTE — ED PROVIDER NOTES
"ED Provider Note    CHIEF COMPLAINT  Chief Complaint   Patient presents with    Ankle Pain     R ankle pain x 3 weeks, CMS in tact, pt able to ambulate          HPI/ROS  LIMITATION TO HISTORY   Select: : None  OUTSIDE HISTORIAN(S):  Significant other at bedside for discussion history and symptoms    Solis Schneider is a 33 y.o. male who presents with complaint of right lateral and dorsal ankle pain, twisting his ankle running approximate 3 weeks ago.  Patient stated he felt he thought it would get better on its own but it has not.  He denies right knee or hip pain.  He denies other injury.    PAST MEDICAL HISTORY       SURGICAL HISTORY  patient denies any surgical history    FAMILY HISTORY  Family History   Problem Relation Age of Onset    Arthritis Mother         DJD    Hypertension Father     Cancer Maternal Grandmother         unclear type       SOCIAL HISTORY  Social History     Tobacco Use    Smoking status: Every Day     Current packs/day: 0.50     Average packs/day: 0.5 packs/day for 10.0 years (5.0 ttl pk-yrs)     Types: Cigarettes    Smokeless tobacco: Never   Vaping Use    Vaping Use: Never used   Substance and Sexual Activity    Alcohol use: No     Comment: previous heavy use for 6 years, quit 2021 before skilled nursing when arrested for DUI    Drug use: No     Comment: no hx IVDA    Sexual activity: Not on file       CURRENT MEDICATIONS  Home Medications       Reviewed by Cierra Martinez R.N. (Registered Nurse) on 05/06/24 at 1207  Med List Status: Partial     Medication Last Dose Status   cyclobenzaprine (FLEXERIL) 5 mg tablet  Active   naproxen (NAPROSYN) 500 MG Tab  Active                    ALLERGIES  No Known Allergies    PHYSICAL EXAM  VITAL SIGNS: /80   Pulse 88   Temp 36.4 °C (97.6 °F) (Temporal)   Resp 18   Ht 1.753 m (5' 9\")   Wt 96.8 kg (213 lb 6.5 oz)   SpO2 97%   BMI 31.51 kg/m²    Skin: Mild dorsal proximal right foot swelling, no ecchymosis, no laceration.  Musculoskeletal: " Right knee and right hip are nontender.  Distal right foot nontender.  There is tenderness over the right lateral malleolus and dorsal aspect of the right foot.  Calcaneus is nontender.  Neurologic: Sensation right foot intact  Vascular: Normal pulse right foot        RADIOLOGY/PROCEDURES   I have independently interpreted the diagnostic imaging associated with this visit and am waiting the final reading from the radiologist.   My preliminary interpretation is as follows: X-ray right ankle negative for fracture    Radiologist interpretation:  DX-ANKLE 3+ VIEWS RIGHT   Final Result      No acute osseous abnormality.             COURSE & MEDICAL DECISION MAKING    ASSESSMENT, COURSE AND PLAN  Care Narrative: Patient with persistent swelling and pain to his right ankle, he has however been ambulating on a sprained ankle rather than crutches and weightbearing as tolerated.  X-rays negative for fracture.  He is placed in an orthopedic boot, provided crutches and referred to orthopedics for follow-up and recheck.  I have advised NSAIDs for pain control if needed        DISPOSITION AND DISCUSSIONS        Decision tools and prescription drugs considered including, but not limited to: Pain Medications prescription and pain medications are not indicated, he will use over-the-counter Tylenol or NSAIDs as needed .    FINAL DIAGNOSIS  1. Sprain of right ankle, unspecified ligament, initial encounter           Electronically signed by: Ellis Wallace M.D., 5/6/2024 12:35 PM